# Patient Record
Sex: FEMALE | Race: WHITE | Employment: FULL TIME | URBAN - NONMETROPOLITAN AREA
[De-identification: names, ages, dates, MRNs, and addresses within clinical notes are randomized per-mention and may not be internally consistent; named-entity substitution may affect disease eponyms.]

---

## 2016-04-06 LAB
CHOLESTEROL, TOTAL: 183 MG/DL
CHOLESTEROL/HDL RATIO: 2.3
HDLC SERPL-MCNC: 81 MG/DL (ref 35–70)
LDL CHOLESTEROL CALCULATED: 94 MG/DL (ref 0–160)
TRIGL SERPL-MCNC: 40 MG/DL
VLDLC SERPL CALC-MCNC: 8 MG/DL

## 2017-06-05 DIAGNOSIS — I10 ESSENTIAL HYPERTENSION: Primary | ICD-10-CM

## 2017-06-06 RX ORDER — LABETALOL 100 MG/1
100 TABLET, FILM COATED ORAL 2 TIMES DAILY
Qty: 180 TABLET | Refills: 3 | OUTPATIENT
Start: 2017-06-06

## 2017-06-10 RX ORDER — LABETALOL 100 MG/1
100 TABLET, FILM COATED ORAL 2 TIMES DAILY
Qty: 180 TABLET | Refills: 3 | Status: SHIPPED | OUTPATIENT
Start: 2017-06-10 | End: 2018-07-03 | Stop reason: SDUPTHER

## 2017-06-13 DIAGNOSIS — I10 ESSENTIAL HYPERTENSION: ICD-10-CM

## 2017-06-14 RX ORDER — LABETALOL 100 MG/1
100 TABLET, FILM COATED ORAL 2 TIMES DAILY
Qty: 180 TABLET | Refills: 3 | OUTPATIENT
Start: 2017-06-14

## 2018-07-03 ENCOUNTER — OFFICE VISIT (OUTPATIENT)
Dept: FAMILY MEDICINE CLINIC | Age: 53
End: 2018-07-03
Payer: COMMERCIAL

## 2018-07-03 VITALS
HEART RATE: 64 BPM | SYSTOLIC BLOOD PRESSURE: 120 MMHG | DIASTOLIC BLOOD PRESSURE: 80 MMHG | WEIGHT: 154 LBS | BODY MASS INDEX: 25.66 KG/M2 | HEIGHT: 65 IN

## 2018-07-03 VITALS
HEART RATE: 64 BPM | BODY MASS INDEX: 24.32 KG/M2 | DIASTOLIC BLOOD PRESSURE: 86 MMHG | WEIGHT: 146 LBS | SYSTOLIC BLOOD PRESSURE: 124 MMHG | HEIGHT: 65 IN

## 2018-07-03 DIAGNOSIS — I10 BENIGN ESSENTIAL HYPERTENSION: ICD-10-CM

## 2018-07-03 DIAGNOSIS — G89.29 CHRONIC PAIN OF RIGHT KNEE: ICD-10-CM

## 2018-07-03 DIAGNOSIS — I10 ESSENTIAL HYPERTENSION: Primary | ICD-10-CM

## 2018-07-03 DIAGNOSIS — Z12.31 SCREENING MAMMOGRAM, ENCOUNTER FOR: ICD-10-CM

## 2018-07-03 DIAGNOSIS — M25.561 CHRONIC PAIN OF RIGHT KNEE: ICD-10-CM

## 2018-07-03 DIAGNOSIS — Z00.00 EXAMINATION, MEDICAL, GENERAL: ICD-10-CM

## 2018-07-03 DIAGNOSIS — Z78.0 ASYMPTOMATIC POSTMENOPAUSAL STATUS: ICD-10-CM

## 2018-07-03 PROCEDURE — 99396 PREV VISIT EST AGE 40-64: CPT | Performed by: FAMILY MEDICINE

## 2018-07-03 RX ORDER — LABETALOL 100 MG/1
100 TABLET, FILM COATED ORAL 2 TIMES DAILY
Qty: 180 TABLET | Refills: 3 | Status: SHIPPED | OUTPATIENT
Start: 2018-07-03 | End: 2019-07-29 | Stop reason: SDUPTHER

## 2018-07-03 RX ORDER — ESTRADIOL 0.1 MG/G
2 CREAM VAGINAL SEE ADMIN INSTRUCTIONS
Qty: 1 TUBE | Refills: 5 | Status: SHIPPED | OUTPATIENT
Start: 2018-07-03 | End: 2019-07-29 | Stop reason: SDUPTHER

## 2018-07-03 ASSESSMENT — ENCOUNTER SYMPTOMS
ABDOMINAL PAIN: 0
BLOOD IN STOOL: 0
SORE THROAT: 0
CONSTIPATION: 0
SHORTNESS OF BREATH: 0
DIARRHEA: 0
WHEEZING: 0

## 2018-07-03 ASSESSMENT — PATIENT HEALTH QUESTIONNAIRE - PHQ9
2. FEELING DOWN, DEPRESSED OR HOPELESS: 0
SUM OF ALL RESPONSES TO PHQ QUESTIONS 1-9: 0
1. LITTLE INTEREST OR PLEASURE IN DOING THINGS: 0
SUM OF ALL RESPONSES TO PHQ9 QUESTIONS 1 & 2: 0

## 2018-07-03 NOTE — PROGRESS NOTES
Current Outpatient Prescriptions   Medication Sig Dispense Refill    labetalol (NORMODYNE) 100 MG tablet Take 1 tablet by mouth 2 times daily 180 tablet 3    estradiol (ESTRACE) 0.1 MG/GM vaginal cream Place 2 g vaginally See Admin Instructions 1 Tube 5     No current facility-administered medications for this visit. No Known Allergies    Health Maintenance   Topic Date Due    Potassium monitoring  1965    Creatinine monitoring  1965    Hepatitis C screen  1965    HIV screen  11/02/1978    Cervical cancer screen  11/02/1984    Lipid screen  11/02/2003    Breast cancer screen  11/02/2013    Shingles Vaccine (1 of 2 - 2 Dose Series) 11/02/2013    Flu vaccine (1) 09/01/2018    DTaP/Tdap/Td vaccine (2 - Td) 11/25/2023    Colon cancer screen colonoscopy  06/13/2026       Subjective:      Review of Systems   Constitutional: Negative for appetite change, chills and fever. HENT: Negative for sore throat. Respiratory: Negative for shortness of breath and wheezing. Cardiovascular: Negative for chest pain, palpitations and leg swelling. Gastrointestinal: Negative for abdominal pain, blood in stool, constipation and diarrhea. Genitourinary: Negative for dysuria, hematuria and urgency. Hematological: Negative for adenopathy. Objective:     /80   Pulse 64   Ht 5' 5\" (1.651 m)   Wt 154 lb (69.9 kg)   LMP 12/02/2010   BMI 25.63 kg/m²     Physical Exam   Constitutional: She appears well-developed and well-nourished. HENT:   Head: Normocephalic. Right Ear: Tympanic membrane normal.   Left Ear: Tympanic membrane normal.   Nose: Nose normal.   Mouth/Throat: No oropharyngeal exudate or posterior oropharyngeal erythema. Neck: Neck supple. Carotid bruit is not present. No thyromegaly present. Cardiovascular: Normal rate, regular rhythm, S1 normal and S2 normal.    No murmur heard. Pulmonary/Chest: Breath sounds normal. She has no wheezes. She has no rhonchi. She has no rales. She exhibits no tenderness. Abdominal: Soft. There is no hepatosplenomegaly. There is no tenderness. Musculoskeletal: She exhibits no edema. Right knee: She exhibits abnormal patellar mobility. She exhibits normal range of motion, no swelling, no effusion, no ecchymosis, normal alignment, no LCL laxity, no bony tenderness, normal meniscus and no MCL laxity. No medial joint line, no lateral joint line, no MCL, no LCL and no patellar tendon tenderness noted. She doesn't have apprehension sign right patella more in [place] and more than 50% of its width   Lymphadenopathy:     She has no cervical adenopathy. She has no axillary adenopathy. Neurological: No cranial nerve deficit. Vitals reviewed. Assessment:      Diagnosis Orders   1. Essential hypertension  labetalol (NORMODYNE) 100 MG tablet   2. Screening mammogram, encounter for  LO DIGITAL SCREEN W CAD BILATERAL   3. Asymptomatic postmenopausal status  DEXA Bone Density Axial Skeleton   4. Examination, medical, general  Comprehensive Metabolic Panel, Fasting    CBC Auto Differential    Lipid Panel   5. Chronic pain of right knee  XR KNEE RIGHT (3 VIEWS)            POC Testing Results (If Applicable):  No results found for this visit on 07/03/18. Plan:   Bone density testing. Mammogram.  Refilled her medication. X-ray right knee. However, by some strengthening exercises for her patellar tracking.   Pending results recheck in 1 year and when necessary      Orders Given:  Orders Placed This Encounter   Procedures    LO DIGITAL SCREEN W CAD BILATERAL     Standing Status:   Future     Standing Expiration Date:   9/2/2019    DEXA Bone Density Axial Skeleton     Standing Status:   Future     Standing Expiration Date:   7/3/2019    XR KNEE RIGHT (3 VIEWS)     Standing Status:   Future     Standing Expiration Date:   7/3/2019    Comprehensive Metabolic Panel, Fasting     Standing Status:   Future     Standing Expiration

## 2018-07-09 LAB
A/G RATIO: 2 RATIO
AGE FOR GFR: 54
ALBUMIN: 4.3 G/DL
ALK PHOSPHATASE: 57 UNITS/L
ALT SERPL-CCNC: 47 UNITS/L
ANION GAP SERPL CALCULATED.3IONS-SCNC: 8 MMOL/L
AST SERPL-CCNC: 35 UNITS/L
BASOPHILS # BLD: 0.05 THOU/MM3
BILIRUB SERPL-MCNC: 0.8 MG/DL
BUN BLDV-MCNC: 20 MG/DL
CALCIUM SERPL-MCNC: 9.4 MG/DL
CHLORIDE BLD-SCNC: 105 MMOL/L
CHOLESTEROL/HDL RATIO: 2.2 RATIO
CHOLESTEROL: 179 MG/DL
CO2: 32 MMOL/L
CREAT SERPL-MCNC: 0.7 MG/DL
DIFFERENTIAL: AUTOMATED DIFF
EGFR BF: 106 ML/MIN/1.73 M2
EGFR BM: 142 ML/MIN/1.73 M2
EGFR WF: 87 ML/MIN/1.73 M2
EGFR WM: 118 ML/MIN/1.73 M2
EOSINOPHIL # BLD: 0.16 THOU/MM3
GLOBULIN: 2.2 G/DL
GLUCOSE: 88 MG/DL
HCT VFR BLD CALC: 38.8 %
HDL, DIRECT: 82 MG/DL
HEMOGLOBIN: 12.6 G/DL
LDL CHOLESTEROL CALCULATED: 88.4 MG/DL
LYMPHOCYTES # BLD: 2.29 THOU/MM3
MCH RBC QN AUTO: 28.2 PG
MCHC RBC AUTO-ENTMCNC: 32.4 G/DL
MCV RBC AUTO: 87.1 FL
MONOCYTES # BLD: 0.36 THOU/MM3
NEUTROPHILS: 2.62 THOU/MM3
PDW BLD-RTO: 12.1 %
PLATELET # BLD: 241 THOU/MM3
PMV BLD AUTO: 8.9 FL
POTASSIUM SERPL-SCNC: 4.2 MMOL/L
RBC # BLD: 4.46 M/UL
SODIUM BLD-SCNC: 141 MMOL/L
TOTAL PROTEIN: 6.5 G/DL
TRIGL SERPL-MCNC: 43 MG/DL
VLDLC SERPL CALC-MCNC: 9 MG/DL
WBC # BLD: 5.49 THOU/ML3

## 2018-07-12 DIAGNOSIS — Z78.0 ASYMPTOMATIC POSTMENOPAUSAL STATUS: ICD-10-CM

## 2019-07-29 ENCOUNTER — OFFICE VISIT (OUTPATIENT)
Dept: FAMILY MEDICINE CLINIC | Age: 54
End: 2019-07-29
Payer: COMMERCIAL

## 2019-07-29 VITALS
SYSTOLIC BLOOD PRESSURE: 110 MMHG | HEART RATE: 65 BPM | WEIGHT: 161 LBS | BODY MASS INDEX: 26.82 KG/M2 | OXYGEN SATURATION: 98 % | HEIGHT: 65 IN | DIASTOLIC BLOOD PRESSURE: 80 MMHG

## 2019-07-29 DIAGNOSIS — I10 ESSENTIAL HYPERTENSION: ICD-10-CM

## 2019-07-29 DIAGNOSIS — Z00.00 EXAMINATION, MEDICAL, GENERAL: Primary | ICD-10-CM

## 2019-07-29 DIAGNOSIS — Z11.59 NEED FOR HEPATITIS C SCREENING TEST: ICD-10-CM

## 2019-07-29 DIAGNOSIS — Z12.31 SCREENING MAMMOGRAM, ENCOUNTER FOR: ICD-10-CM

## 2019-07-29 PROCEDURE — 99396 PREV VISIT EST AGE 40-64: CPT | Performed by: FAMILY MEDICINE

## 2019-07-29 RX ORDER — LABETALOL 100 MG/1
100 TABLET, FILM COATED ORAL 2 TIMES DAILY
Qty: 180 TABLET | Refills: 3 | Status: SHIPPED | OUTPATIENT
Start: 2019-07-29 | End: 2020-06-17 | Stop reason: SDUPTHER

## 2019-07-29 RX ORDER — ESTRADIOL 0.1 MG/G
2 CREAM VAGINAL SEE ADMIN INSTRUCTIONS
Qty: 1 TUBE | Refills: 5 | Status: SHIPPED | OUTPATIENT
Start: 2019-07-29 | End: 2020-06-17 | Stop reason: SDUPTHER

## 2019-07-29 ASSESSMENT — ENCOUNTER SYMPTOMS
COUGH: 0
DIARRHEA: 0
SHORTNESS OF BREATH: 0
ABDOMINAL PAIN: 0
BLOOD IN STOOL: 0
CONSTIPATION: 0
WHEEZING: 0

## 2019-07-29 ASSESSMENT — PATIENT HEALTH QUESTIONNAIRE - PHQ9
SUM OF ALL RESPONSES TO PHQ9 QUESTIONS 1 & 2: 0
SUM OF ALL RESPONSES TO PHQ QUESTIONS 1-9: 0
1. LITTLE INTEREST OR PLEASURE IN DOING THINGS: 0
SUM OF ALL RESPONSES TO PHQ QUESTIONS 1-9: 0
2. FEELING DOWN, DEPRESSED OR HOPELESS: 0

## 2019-07-30 LAB
A/G RATIO: 1.9 RATIO
AGE FOR GFR: 55
ALBUMIN: 4.6 G/DL (ref 3.5–5)
ALK PHOSPHATASE: 62 UNITS/L (ref 38–126)
ALT SERPL-CCNC: 40 UNITS/L (ref 9–52)
ANION GAP SERPL CALCULATED.3IONS-SCNC: 12 MMOL/L
AST SERPL-CCNC: 36 UNITS/L (ref 14–36)
BASOPHILS # BLD: 0.06 THOU/MM3 (ref 0–0.3)
BILIRUB SERPL-MCNC: 0.9 MG/DL (ref 0.2–1.3)
BUN BLDV-MCNC: 14 MG/DL (ref 7–17)
CALCIUM SERPL-MCNC: 9.7 MG/DL (ref 8.4–10.2)
CHLORIDE BLD-SCNC: 104 MMOL/L (ref 98–120)
CHOLESTEROL/HDL RATIO: 2.6 RATIO (ref 0–4.5)
CHOLESTEROL: 184 MG/DL (ref 50–200)
CO2: 30 MMOL/L (ref 22–31)
CREAT SERPL-MCNC: 0.8 MG/DL (ref 0.5–1)
DIFFERENTIAL: AUTOMATED DIFF
EGFR BF: 90 ML/MIN/1.73 M2
EGFR BM: 122 ML/MIN/1.73 M2
EGFR WF: 74 ML/MIN/1.73 M2
EGFR WM: 100 ML/MIN/1.73 M2
EOSINOPHIL # BLD: 0.1 THOU/MM3 (ref 0–1.1)
GLOBULIN: 2.4 G/DL
GLUCOSE: 98 MG/DL (ref 65–105)
HCT VFR BLD CALC: 39.8 % (ref 37–47)
HDL, DIRECT: 71 MG/DL (ref 36–68)
HEMOGLOBIN: 13.3 G/DL (ref 12–16)
HEPATITIS C IGG: NORMAL
LDL CHOLESTEROL CALCULATED: 99.4 MG/DL (ref 0–160)
LYMPHOCYTES # BLD: 2 THOU/MM3 (ref 1–5.5)
MCH RBC QN AUTO: 28.7 PG (ref 28.5–32)
MCHC RBC AUTO-ENTMCNC: 33.4 G/DL (ref 32–37)
MCV RBC AUTO: 86 FL (ref 80–94)
MONOCYTES # BLD: 0.33 THOU/MM3 (ref 0.1–1)
NEUTROPHILS: 2.13 THOU/MM3 (ref 2–8.1)
PDW BLD-RTO: 11.1 % (ref 8.5–15.5)
PLATELET # BLD: 250 THOU/MM3 (ref 130–400)
PMV BLD AUTO: 8.3 FL (ref 7.4–11)
POTASSIUM SERPL-SCNC: 4.4 MMOL/L (ref 3.6–5)
RBC # BLD: 4.63 M/UL (ref 4.2–5.4)
SIGNAL/CUTOFF: NORMAL
SODIUM BLD-SCNC: 142 MMOL/L (ref 135–145)
TOTAL PROTEIN: 7 G/DL (ref 6.3–8.2)
TRIGL SERPL-MCNC: 68 MG/DL (ref 10–250)
VLDLC SERPL CALC-MCNC: 14 MG/DL (ref 0–40)
WBC # BLD: 4.62 THOU/ML3 (ref 4.8–10)

## 2019-07-31 DIAGNOSIS — Z11.59 NEED FOR HEPATITIS C SCREENING TEST: ICD-10-CM

## 2020-06-17 ENCOUNTER — HOSPITAL ENCOUNTER (OUTPATIENT)
Age: 55
Setting detail: SPECIMEN
Discharge: HOME | End: 2020-06-17
Payer: COMMERCIAL

## 2020-06-17 ENCOUNTER — OFFICE VISIT (OUTPATIENT)
Dept: FAMILY MEDICINE CLINIC | Age: 55
End: 2020-06-17
Payer: COMMERCIAL

## 2020-06-17 VITALS
BODY MASS INDEX: 26.29 KG/M2 | SYSTOLIC BLOOD PRESSURE: 118 MMHG | WEIGHT: 158 LBS | OXYGEN SATURATION: 98 % | DIASTOLIC BLOOD PRESSURE: 82 MMHG | HEART RATE: 69 BPM

## 2020-06-17 PROCEDURE — G8427 DOCREV CUR MEDS BY ELIG CLIN: HCPCS | Performed by: NURSE PRACTITIONER

## 2020-06-17 PROCEDURE — 1036F TOBACCO NON-USER: CPT | Performed by: NURSE PRACTITIONER

## 2020-06-17 PROCEDURE — G0145 SCR C/V CYTO,THINLAYER,RESCR: HCPCS

## 2020-06-17 PROCEDURE — 99396 PREV VISIT EST AGE 40-64: CPT | Performed by: NURSE PRACTITIONER

## 2020-06-17 PROCEDURE — G8419 CALC BMI OUT NRM PARAM NOF/U: HCPCS | Performed by: NURSE PRACTITIONER

## 2020-06-17 PROCEDURE — 3017F COLORECTAL CA SCREEN DOC REV: CPT | Performed by: NURSE PRACTITIONER

## 2020-06-17 RX ORDER — LABETALOL 100 MG/1
100 TABLET, FILM COATED ORAL 2 TIMES DAILY
Qty: 180 TABLET | Refills: 3 | Status: SHIPPED | OUTPATIENT
Start: 2020-06-17 | End: 2021-04-08 | Stop reason: SDUPTHER

## 2020-06-17 RX ORDER — ESTRADIOL 0.1 MG/G
2 CREAM VAGINAL SEE ADMIN INSTRUCTIONS
Qty: 1 TUBE | Refills: 3 | Status: SHIPPED | OUTPATIENT
Start: 2020-06-17 | End: 2021-04-08 | Stop reason: SDUPTHER

## 2020-06-17 ASSESSMENT — ENCOUNTER SYMPTOMS
DIARRHEA: 0
WHEEZING: 0
CONSTIPATION: 0
SHORTNESS OF BREATH: 0
BLOOD IN STOOL: 0
COUGH: 0
ABDOMINAL PAIN: 0

## 2020-06-17 ASSESSMENT — PATIENT HEALTH QUESTIONNAIRE - PHQ9
SUM OF ALL RESPONSES TO PHQ QUESTIONS 1-9: 0
SUM OF ALL RESPONSES TO PHQ QUESTIONS 1-9: 0
SUM OF ALL RESPONSES TO PHQ9 QUESTIONS 1 & 2: 0
1. LITTLE INTEREST OR PLEASURE IN DOING THINGS: 0
2. FEELING DOWN, DEPRESSED OR HOPELESS: 0

## 2020-06-21 ASSESSMENT — ENCOUNTER SYMPTOMS
ORTHOPNEA: 0
BLURRED VISION: 0

## 2020-06-24 LAB — CYTOLOGY REPORT: NORMAL

## 2020-07-10 LAB
ANION GAP SERPL CALCULATED.3IONS-SCNC: 9.3 MMOL/L
BUN BLDV-MCNC: 18 MG/DL (ref 7–17)
CALCIUM SERPL-MCNC: 9.6 MG/DL (ref 8.4–10.2)
CHLORIDE BLD-SCNC: 102 MMOL/L (ref 98–120)
CHOLESTEROL/HDL RATIO: 2.3 RATIO (ref 0–4.5)
CHOLESTEROL: 191 MG/DL (ref 50–200)
CO2: 28 MMOL/L (ref 22–31)
CREAT SERPL-MCNC: 0.7 MG/DL (ref 0.5–1)
CREATININE, RANDOM URINE: 78.7 MG/DL (ref 20–370)
GFR CALCULATED: > 60
GLUCOSE: 96 MG/DL (ref 65–105)
HDLC SERPL-MCNC: 83 MG/DL (ref 36–68)
LDL CHOLESTEROL CALCULATED: 97.8 MG/DL (ref 0–160)
MICROALBUMIN UR-MCNC: < 0.6 MG/DL (ref 0–1.7)
POTASSIUM SERPL-SCNC: 4.3 MMOL/L (ref 3.6–5)
SODIUM BLD-SCNC: 139 MMOL/L (ref 135–145)
TRIGL SERPL-MCNC: 51 MG/DL (ref 10–250)
VLDLC SERPL CALC-MCNC: 10 MG/DL (ref 0–40)

## 2020-07-16 ENCOUNTER — TELEPHONE (OUTPATIENT)
Dept: FAMILY MEDICINE CLINIC | Age: 55
End: 2020-07-16

## 2020-07-16 NOTE — TELEPHONE ENCOUNTER
Patient would like 6/17/20 visit rebilled as a wellness visit, instead of office visit. Jayy Clinton it was scheduled as a Pap/wellness visit. If you agree, need to do addendum off of original schedule and that should adjust the billing. If there is a problem with that, we should let patient know.

## 2021-08-25 ENCOUNTER — TELEPHONE (OUTPATIENT)
Dept: FAMILY MEDICINE CLINIC | Age: 56
End: 2021-08-25

## 2021-08-25 NOTE — TELEPHONE ENCOUNTER
----- Message from Antoine Market sent at 8/25/2021 10:00 AM EDT -----  Subject: Appointment Request    Reason for Call: Routine Medicare AWV    QUESTIONS  Type of Appointment? Established Patient  Reason for appointment request? No appointments available during search  Additional Information for Provider? Patient states that she would like to   schedule for her well visit. Patient also would like to inform Dr. Saira Durán that she is vaccinated for covid.   ---------------------------------------------------------------------------  --------------  CALL BACK INFO  What is the best way for the office to contact you? OK to leave message on   voicemail  Preferred Call Back Phone Number? [phone]  ---------------------------------------------------------------------------  --------------  SCRIPT ANSWERS  Relationship to Patient? Self  (If the patient has Medicare as their primary insurance coverage ask this   question) Are you requesting a Medicare Annual Wellness Visit? Yes   Have you been diagnosed with, awaiting test results for, or told that you   are suspected of having COVID-19 (Coronavirus)? (If patient has tested   negative or was tested as a requirement for work, school, or travel and   not based on symptoms, answer no)? No  Do you currently have flu-like symptoms including fever or chills, cough,   shortness of breath, difficulty breathing, or new loss of taste or smell? No  Have you had close contact with someone with COVID-19 in the last 14 days? No  (Service Expert  click yes below to proceed with [facility] As Usual   Scheduling)?  Yes

## 2021-08-30 ENCOUNTER — OFFICE VISIT (OUTPATIENT)
Dept: FAMILY MEDICINE CLINIC | Age: 56
End: 2021-08-30
Payer: COMMERCIAL

## 2021-08-30 VITALS
OXYGEN SATURATION: 97 % | WEIGHT: 163.3 LBS | SYSTOLIC BLOOD PRESSURE: 124 MMHG | HEIGHT: 64 IN | HEART RATE: 67 BPM | DIASTOLIC BLOOD PRESSURE: 80 MMHG | BODY MASS INDEX: 27.88 KG/M2

## 2021-08-30 DIAGNOSIS — Z00.00 ENCOUNTER FOR WELLNESS EXAMINATION IN ADULT: Primary | ICD-10-CM

## 2021-08-30 DIAGNOSIS — Z12.31 ENCOUNTER FOR SCREENING MAMMOGRAM FOR BREAST CANCER: ICD-10-CM

## 2021-08-30 DIAGNOSIS — Z13.220 SCREENING FOR HYPERLIPIDEMIA: ICD-10-CM

## 2021-08-30 DIAGNOSIS — I10 ESSENTIAL HYPERTENSION: ICD-10-CM

## 2021-08-30 DIAGNOSIS — N95.1 MENOPAUSAL VAGINAL DRYNESS: ICD-10-CM

## 2021-08-30 PROCEDURE — 99396 PREV VISIT EST AGE 40-64: CPT | Performed by: NURSE PRACTITIONER

## 2021-08-30 RX ORDER — LABETALOL 100 MG/1
100 TABLET, FILM COATED ORAL 2 TIMES DAILY
Qty: 180 TABLET | Refills: 2 | Status: SHIPPED | OUTPATIENT
Start: 2021-08-30 | End: 2022-09-01 | Stop reason: SDUPTHER

## 2021-08-30 RX ORDER — ESTRADIOL 0.1 MG/G
2 CREAM VAGINAL DAILY
Qty: 1 TUBE | Refills: 2 | Status: SHIPPED | OUTPATIENT
Start: 2021-08-30 | End: 2022-09-01 | Stop reason: SDUPTHER

## 2021-08-30 SDOH — ECONOMIC STABILITY: FOOD INSECURITY: WITHIN THE PAST 12 MONTHS, YOU WORRIED THAT YOUR FOOD WOULD RUN OUT BEFORE YOU GOT MONEY TO BUY MORE.: NEVER TRUE

## 2021-08-30 SDOH — ECONOMIC STABILITY: FOOD INSECURITY: WITHIN THE PAST 12 MONTHS, THE FOOD YOU BOUGHT JUST DIDN'T LAST AND YOU DIDN'T HAVE MONEY TO GET MORE.: NEVER TRUE

## 2021-08-30 ASSESSMENT — PATIENT HEALTH QUESTIONNAIRE - PHQ9
SUM OF ALL RESPONSES TO PHQ QUESTIONS 1-9: 0
SUM OF ALL RESPONSES TO PHQ QUESTIONS 1-9: 0
2. FEELING DOWN, DEPRESSED OR HOPELESS: 0
SUM OF ALL RESPONSES TO PHQ QUESTIONS 1-9: 0
SUM OF ALL RESPONSES TO PHQ9 QUESTIONS 1 & 2: 0
1. LITTLE INTEREST OR PLEASURE IN DOING THINGS: 0

## 2021-08-30 ASSESSMENT — SOCIAL DETERMINANTS OF HEALTH (SDOH): HOW HARD IS IT FOR YOU TO PAY FOR THE VERY BASICS LIKE FOOD, HOUSING, MEDICAL CARE, AND HEATING?: NOT HARD AT ALL

## 2021-08-30 NOTE — PROGRESS NOTES
[address] [address] [address]  Dept: [phone]  Dept Fax: [phone]      Chief Complaint   Patient presents with    Annual Exam     need refill on blood pressure medication and Estrace       HPI:   Well Adult Physical: Patient here for a comprehensive physical exam.The patient reports no problems. Do you take any herbs or supplements that were not prescribed by a doctor? No.  Are you taking calcium supplements? No.  Are you taking aspirin daily? No. She wears seatbelts while riding a car. She does not text or talk on the phone while driving. She performs all ofher ADL's without problem. She is independent, cooks, drives, bathes, and gets dressed without assistance. Last eye exam: up to date, 3/2021. Last dental visit 7/20/2021. She exercises by walking 3 to 5 days a week to 2-3.5 miles at a time.  History:  LMP: Patient's last menstrual period was 12/02/2010. Menopause 11 years ago  Last pap date: 6/17/2020  Abnormal pap? No    Patient completed COVID-19 vaccination?  Yes     The 10-year ASCVD risk score (Bashir Leo, et al., 2013) is: 1.5%    Values used to calculate the score:      Age: 60 years      Sex: Female      Is Non- : No      Diabetic: No      Tobacco smoker: No      Systolic Blood Pressure: 171 mmHg      Is BP treated: No      HDL Cholesterol: 83 mg/dL      Total Cholesterol: 191 mg/dL     BP Readings from Last 3 Encounters:   08/30/21 124/80   06/17/20 118/82   07/29/19 110/80        Pulse Readings from Last 3 Encounters:   08/30/21 67   06/17/20 69   07/29/19 65        Wt Readings from Last 3 Encounters:   08/30/21 163 lb 4.8 oz (74.1 kg)   06/17/20 158 lb (71.7 kg)   07/29/19 161 lb (73 kg)        Patient Care Team:  TROY Wise CNP as PCP - General (Family Medicine)  TROY Wise CNP as PCP - [facility] Empaneled Provider    Current Outpatient Medications   Medication Sig Dispense Refill    estradiol (ESTRACE) 0.1 MG/GM vaginal cream Place 2 g vaginally daily 1 Tube 2    labetalol (NORMODYNE) 100 MG tablet Take 1 tablet by mouth 2 times daily 180 tablet 2     No current facility-administered medications for this visit. Past Medical History:   Diagnosis Date    Hypertension        Past Surgical History:   Procedure Laterality Date     SECTION      x 3    CHOLECYSTECTOMY      COLONOSCOPY  16    chey, Dr. Amari Rosario at [address]         Family History   Problem Relation Age of Onset    High Cholesterol Mother     Heart Disease Father     Atrial Fibrillation Father         pacemaker       Social History     Socioeconomic History    Marital status:      Spouse name: Not on file    Number of children: Not on file    Years of education: Not on file    Highest education level: Not on file   Occupational History    Not on file   Tobacco Use    Smoking status: Never Smoker    Smokeless tobacco: Never Used   Substance and Sexual Activity    Alcohol use: No    Drug use: No    Sexual activity: Not on file   Other Topics Concern    Not on file   Social History Narrative    Not on file     Social Determinants of Health     Financial Resource Strain: Low Risk     Difficulty of Paying Living Expenses: Not hard at all   Food Insecurity: No Food Insecurity    Worried About [facility] in the Last Year: Never true    [facility] in the Last Year: Never true   Transportation Needs:     Lack of Transportation (Medical):      Lack of Transportation (Non-Medical):    Physical Activity:     Days of Exercise per Week:     Minutes of Exercise per Session:    Stress:     Feeling of Stress :    Social Connections:     Frequency of Communication with Friends and Family:     Frequency of Social Gatherings with Friends and Family:     Attends Congregational Services:     Active Member of Clubs or Organizations:     Attends Club or Organization Meetings:     Marital Status:    Intimate Partner Violence:     Fear of Current or Ex-Partner:     Emotionally Abused:     Physically Abused:     Sexually Abused:        No Known Allergies    Patient Active Problem List   Diagnosis    Essential hypertension    Menopausal vaginal dryness     Preventive Care:  Health Maintenance   Topic Date Due    HIV screen  Never done    Shingles Vaccine (1 of 2) Never done    Potassium monitoring  07/10/2021    Creatinine monitoring  07/10/2021    Flu vaccine (1) Never done    Breast cancer screen  08/15/2021    Cervical cancer screen  06/17/2023    DTaP/Tdap/Td vaccine (2 - Td or Tdap) 11/25/2023    Lipid screen  07/10/2025    Colon cancer screen colonoscopy  06/13/2026    COVID-19 Vaccine  Completed    Hepatitis C screen  Completed    Hepatitis A vaccine  Aged Out    Hepatitis B vaccine  Aged Out    Hib vaccine  Aged Out    Meningococcal (ACWY) vaccine  Aged Out    Pneumococcal 0-64 years Vaccine  Aged Out        Subjective:     Review of Systems   Constitutional: Negative for chills, diaphoresis, fatigue and fever. HENT: Negative. Eyes: Negative. Respiratory: Negative for cough, shortness of breath and wheezing. Cardiovascular: Negative for chest pain, palpitations and leg swelling. Gastrointestinal: Negative for abdominal pain, constipation, diarrhea and nausea. Endocrine: Negative for cold intolerance, heat intolerance, polydipsia, polyphagia and polyuria. Genitourinary: Positive for dyspareunia (dryness - cream helps). Musculoskeletal: Negative for arthralgias and myalgias. Skin: Negative. Allergic/Immunologic: Negative for environmental allergies and food allergies. Neurological: Negative for dizziness, weakness, light-headedness and headaches.    Psychiatric/Behavioral: Negative for agitation, decreased concentration, dysphoric mood, self-injury, sleep disturbance and suicidal ideas. The patient is not nervous/anxious. Objective:     Blood pressure 124/80, pulse 67, height 5' 3.5\" (1.613 m), weight 163 lb 4.8 oz (74.1 kg), last menstrual period 12/02/2010, SpO2 97 %. Body mass index is 28.47 kg/m². Physical Exam  Constitutional:       Appearance: Normal appearance. She is well-developed and well-groomed. HENT:      Head: Normocephalic. Right Ear: Tympanic membrane and ear canal normal.      Left Ear: Tympanic membrane and ear canal normal.      Nose: Nose normal.      Mouth/Throat:      Mouth: Mucous membranes are moist.   Eyes:      Conjunctiva/sclera: Conjunctivae normal.      Pupils: Pupils are equal, round, and reactive to light. Neck:      Thyroid: No thyromegaly. Vascular: No carotid bruit or JVD. Cardiovascular:      Rate and Rhythm: Normal rate and regular rhythm. Heart sounds: Normal heart sounds. Pulmonary:      Effort: Pulmonary effort is normal. No respiratory distress. Breath sounds: Normal breath sounds. No wheezing. Abdominal:      General: Bowel sounds are normal.      Palpations: Abdomen is soft. Tenderness: There is no abdominal tenderness. Musculoskeletal:      Cervical back: Neck supple. Right lower leg: No edema. Left lower leg: No edema. Lymphadenopathy:      Cervical: No cervical adenopathy. Skin:     Capillary Refill: Capillary refill takes less than 2 seconds. Neurological:      Mental Status: She is alert and oriented to person, place, and time. Psychiatric:         Mood and Affect: Mood normal.         Behavior: Behavior is cooperative. PHQ Scores 8/30/2021 6/17/2020 7/29/2019 7/3/2018   PHQ2 Score 0 0 0 0   PHQ9 Score 0 0 0 0     Interpretation of Total Score Depression Severity: 1-4 = Minimal depression, 5-9 = Mild depression, 10-14 = Moderate depression, 15-19 = Moderately severe depression, 20-27 = Severe depression     Assessment:     1.  Encounter for wellness examination in adult 2. Essential hypertension    3. Menopausal vaginal dryness    4. Screening for hyperlipidemia    5. Encounter for screening mammogram for breast cancer        Plan:     Orders Placed This Encounter   Medications    estradiol (ESTRACE) 0.1 MG/GM vaginal cream     Sig: Place 2 g vaginally daily     Dispense:  1 Tube     Refill:  2    labetalol (NORMODYNE) 100 MG tablet     Sig: Take 1 tablet by mouth 2 times daily     Dispense:  180 tablet     Refill:  2     Orders Placed This Encounter   Procedures    LO DIGITAL SCREEN W OR WO CAD BILATERAL     Standing Status:   Future     Standing Expiration Date:   8/30/2022     Order Specific Question:   Reason for exam:     Answer:   screening    Lipid, Fasting     Standing Status:   Future     Standing Expiration Date:   8/30/2022    Microalbumin, Ur     Standing Status:   Future     Standing Expiration Date:   8/30/2022    Basic Metabolic Panel     Standing Status:   Future     Standing Expiration Date:   8/30/2022       Follow a low fat, low cholesterol diet, attempt to lose weight, reduce salt in diet and cooking, reduce exposure to stress, continue current medications, continue current healthy lifestyle patterns and return for routine annual checkups. She was also counseled on her preventative health maintenance.      EDUCATION PROVIDED  Discussed and/or Handout Given on the following items:            [x] Caffeine Use: Limit to 2 cups per day   (400mg of caffeine a day)     [x] Exercise: Ideal 30 minutes per day, above normal activity              [x] Eating Fruits and Vegetables: Studies show 8-10 servings per day decrease BP  [x] Alcohol: Ideal maximum of one cup per day for females and two cups per day for a male       Electronically signed by TROY Deluna CNP on 9/4/2021

## 2021-09-04 PROBLEM — N95.1 MENOPAUSAL VAGINAL DRYNESS: Status: ACTIVE | Noted: 2021-09-04

## 2021-09-04 ASSESSMENT — ENCOUNTER SYMPTOMS
EYES NEGATIVE: 1
COUGH: 0
WHEEZING: 0
CONSTIPATION: 0
ABDOMINAL PAIN: 0
DIARRHEA: 0
SHORTNESS OF BREATH: 0
NAUSEA: 0

## 2021-09-10 LAB
ANION GAP SERPL CALCULATED.3IONS-SCNC: 7.3 MMOL/L
BUN BLDV-MCNC: 15 MG/DL (ref 7–17)
CALCIUM SERPL-MCNC: 9.8 MG/DL (ref 8.4–10.2)
CHLORIDE BLD-SCNC: 104 MMOL/L (ref 98–120)
CHOLESTEROL/HDL RATIO: 2.44 RATIO (ref 0–4.5)
CHOLESTEROL: 210 MG/DL (ref 50–200)
CO2: 31 MMOL/L (ref 22–31)
CREAT SERPL-MCNC: 0.8 MG/DL (ref 0.5–1)
CREATININE, RANDOM URINE: 193.1 MG/DL (ref 20–370)
GFR CALCULATED: > 60
GLUCOSE: 97 MG/DL (ref 65–105)
HDLC SERPL-MCNC: 86 MG/DL (ref 36–68)
LDL CHOLESTEROL CALCULATED: 112.8 MG/DL (ref 0–160)
MICROALBUMIN UR-MCNC: 0.8 MG/DL (ref 0–1.7)
MICROALBUMIN/CREAT UR-RTO: 4.14
POTASSIUM SERPL-SCNC: 4.4 MMOL/L (ref 3.6–5)
SODIUM BLD-SCNC: 142 MMOL/L (ref 135–145)
TRIGL SERPL-MCNC: 56 MG/DL (ref 10–250)
VLDLC SERPL CALC-MCNC: 11 MG/DL (ref 0–50)

## 2022-09-01 ENCOUNTER — OFFICE VISIT (OUTPATIENT)
Dept: FAMILY MEDICINE CLINIC | Age: 57
End: 2022-09-01
Payer: COMMERCIAL

## 2022-09-01 VITALS
WEIGHT: 169.2 LBS | DIASTOLIC BLOOD PRESSURE: 82 MMHG | SYSTOLIC BLOOD PRESSURE: 134 MMHG | BODY MASS INDEX: 29.5 KG/M2 | HEART RATE: 66 BPM | OXYGEN SATURATION: 98 %

## 2022-09-01 DIAGNOSIS — Z13.220 SCREENING FOR HYPERLIPIDEMIA: ICD-10-CM

## 2022-09-01 DIAGNOSIS — I10 ESSENTIAL HYPERTENSION: ICD-10-CM

## 2022-09-01 DIAGNOSIS — Z12.31 ENCOUNTER FOR SCREENING MAMMOGRAM FOR BREAST CANCER: ICD-10-CM

## 2022-09-01 DIAGNOSIS — N95.1 MENOPAUSAL VAGINAL DRYNESS: ICD-10-CM

## 2022-09-01 DIAGNOSIS — G89.29 CHRONIC BILATERAL LOW BACK PAIN, UNSPECIFIED WHETHER SCIATICA PRESENT: ICD-10-CM

## 2022-09-01 DIAGNOSIS — Z00.00 ENCOUNTER FOR WELLNESS EXAMINATION IN ADULT: Primary | ICD-10-CM

## 2022-09-01 DIAGNOSIS — M54.50 CHRONIC BILATERAL LOW BACK PAIN, UNSPECIFIED WHETHER SCIATICA PRESENT: ICD-10-CM

## 2022-09-01 LAB
ALBUMIN/GLOBULIN RATIO: 1.9 G/DL
ALBUMIN: 4.8 G/DL (ref 3.5–5)
ALP BLD-CCNC: 72 UNITS/L (ref 38–126)
ALT SERPL-CCNC: 54 UNITS/L (ref 4–35)
ANION GAP SERPL CALCULATED.3IONS-SCNC: 7.7 MMOL/L
AST SERPL-CCNC: 40 UNITS/L (ref 14–36)
BASOPHILS %: 1.03 (ref 0–3)
BASOPHILS ABSOLUTE: 0.06 (ref 0–0.3)
BILIRUB SERPL-MCNC: 0.6 MG/DL (ref 0.2–1.3)
BUN BLDV-MCNC: 20 MG/DL (ref 7–17)
CALCIUM SERPL-MCNC: 9.6 MG/DL (ref 8.4–10.2)
CHLORIDE BLD-SCNC: 106 MMOL/L (ref 98–120)
CHOLESTEROL/HDL RATIO: 2.95 RATIO (ref 0–4.5)
CHOLESTEROL: 224 MG/DL (ref 50–200)
CO2: 29 MMOL/L (ref 22–31)
CREAT SERPL-MCNC: 0.8 MG/DL (ref 0.5–1)
CREATININE, RANDOM URINE: 138 MG/DL (ref 20–370)
EOSINOPHILS %: 3.18 (ref 0–10)
EOSINOPHILS ABSOLUTE: 0.19 (ref 0–1.1)
GFR CALCULATED: > 60
GLOBULIN: 2.5 G/DL
GLUCOSE: 94 MG/DL (ref 65–105)
HCT VFR BLD CALC: 42.5 % (ref 37–47)
HDLC SERPL-MCNC: 76 MG/DL (ref 36–68)
HEMOGLOBIN: 12.9 (ref 12–16)
LDL CHOLESTEROL CALCULATED: 134.8 MG/DL (ref 0–160)
LYMPHOCYTE %: 35.28 (ref 20–51.1)
LYMPHOCYTES ABSOLUTE: 2.13 (ref 1–5.5)
MCH RBC QN AUTO: 27.9 PG (ref 28.5–32.5)
MCHC RBC AUTO-ENTMCNC: 30.5 G/DL (ref 32–37)
MCV RBC AUTO: 91.5 FL (ref 80–94)
MICROALBUMIN UR-MCNC: 0.9 MG/DL (ref 0–1.7)
MICROALBUMIN/CREAT UR-RTO: 6.52
MONOCYTES %: 6.97 (ref 1.7–9.3)
MONOCYTES ABSOLUTE: 0.42 (ref 0.1–1)
NEUTROPHILS %: 53.54 (ref 42.2–75.2)
NEUTROPHILS ABSOLUTE: 3.23 (ref 2–8.1)
PDW BLD-RTO: 12.7 % (ref 8.5–15.5)
PLATELET # BLD: 264.6 THOU/MM3 (ref 130–400)
POTASSIUM SERPL-SCNC: 4.4 MMOL/L (ref 3.6–5)
RBC: 4.64 M/UL (ref 4.2–5.4)
SODIUM BLD-SCNC: 142 MMOL/L (ref 135–145)
TOTAL PROTEIN, SERUM: 7.3 G/DL (ref 6.3–8.2)
TRIGL SERPL-MCNC: 66 MG/DL (ref 10–250)
VLDLC SERPL CALC-MCNC: 13 MG/DL (ref 0–50)
WBC: 6 THOU/ML3 (ref 4.8–10.8)

## 2022-09-01 PROCEDURE — 99396 PREV VISIT EST AGE 40-64: CPT | Performed by: NURSE PRACTITIONER

## 2022-09-01 RX ORDER — ESTRADIOL 0.1 MG/G
2 CREAM VAGINAL DAILY
Qty: 1 EACH | Refills: 2 | Status: SHIPPED | OUTPATIENT
Start: 2022-09-01

## 2022-09-01 RX ORDER — MELOXICAM 7.5 MG/1
TABLET ORAL
Qty: 60 TABLET | Refills: 0 | Status: SHIPPED | OUTPATIENT
Start: 2022-09-01

## 2022-09-01 RX ORDER — LABETALOL 100 MG/1
100 TABLET, FILM COATED ORAL 2 TIMES DAILY
Qty: 180 TABLET | Refills: 2 | Status: SHIPPED | OUTPATIENT
Start: 2022-09-01

## 2022-09-01 ASSESSMENT — PATIENT HEALTH QUESTIONNAIRE - PHQ9
SUM OF ALL RESPONSES TO PHQ QUESTIONS 1-9: 0
SUM OF ALL RESPONSES TO PHQ QUESTIONS 1-9: 0
SUM OF ALL RESPONSES TO PHQ9 QUESTIONS 1 & 2: 0
1. LITTLE INTEREST OR PLEASURE IN DOING THINGS: 0
2. FEELING DOWN, DEPRESSED OR HOPELESS: 0
SUM OF ALL RESPONSES TO PHQ QUESTIONS 1-9: 0
SUM OF ALL RESPONSES TO PHQ QUESTIONS 1-9: 0

## 2022-09-01 ASSESSMENT — ENCOUNTER SYMPTOMS: BOWEL INCONTINENCE: 0

## 2022-09-01 NOTE — PROGRESS NOTES
[address] [address] [address]  Dept: [phone]  Dept Fax: [phone]    No chief complaint on file. Well Adult Physical: Patient here for a comprehensive physical exam.The patient reports {problems:64010}  Do you take any herbs or supplements that were not prescribed by a doctor? no Are you taking calcium supplements? no Are you taking aspirin daily? no    She {DOES/DOES NOT:20953} work. She works *** hours a week at [employer] She does not take over the counter vitamins. She wears seatbelts while riding a car. She does not text or talk on the phone while driving. She performs all ofher ADL's without problem. She is independent, cooks, drives, bathes, and gets dressed without assistance. Last eye exam: ***,{NORMAL/ABNORMAL:584924945}. Last dental exam: She {IS/IS NOT:9024} happy with her life. She rates her stress level a {NUMBERS 0-12:66392} in a scale 1-10.  History:  {gu history select gender:98736}     GYNECOLOGIC HISTORY:    Her last pap smear was Normal {ABNORMALPAP RESULTS:444347089} and was done on 6/17/2020. Her HPV status is not done. STD history: Yes. She denies signs of vaginitis. Cramping: Yes  Birth control method: ***  She {DOES/DOES NOT:20953} perform regular breast self exams. Menopause: {YES OR NO:20022}. WELL QUESTIONS  How many cups of caffeine do you drink per day? {Blank multiple:19196::\"I do not consume any caffeine products daily\",\"*** cups of tea\",\"*** cups of soda\",\"*** cups of energy drink\",\"*** snack sized candy bars a day\",\"***full sized candy bars a day\",\"*** cups of coffee\"}   2. Do you eat a minimum of 8-10 servings of fruits and vegetables per day? {Blank single:19197::\"Yes\",\"No, on average the patient consumes *** servings of fruits and vegetables per day\"}  3. Do you drink alcohol? {Blank single:83898::\"Yes, on average the patient consumes *** cups of alcohol daily\",\"No\"}  4. How many oz of water do you drink per day?  (64 oz per day recommended) ***  5.  Do you exercise a minimum of 30 minutes per day, above normal activity? {Blank single:23542::\"Yes\",\"No, on average the patient performs *** minutes of exercise per day\"}    6. Last eye exam: ***,{NORMAL/ABNORMAL:284652357}. Patient completed COVID-19 vaccination? Yes     The ASCVD Risk score (Lisa Ruggiero et al., 2013) failed to calculate for the following reasons: The systolic blood pressure is missing     BP Readings from Last 3 Encounters:   21 124/80   20 118/82   19 110/80        Pulse Readings from Last 3 Encounters:   21 67   20 69   19 65        Wt Readings from Last 3 Encounters:   21 163 lb 4.8 oz (74.1 kg)   20 158 lb (71.7 kg)   19 161 lb (73 kg)        Patient Care Team:  TROY Maldonado CNP as PCP - General (Family Medicine)  TROY aMldonado CNP as PCP - [facility] Empaneled Provider    Current Outpatient Medications   Medication Sig Dispense Refill    estradiol (ESTRACE) 0.1 MG/GM vaginal cream Place 2 g vaginally daily 1 Tube 2    labetalol (NORMODYNE) 100 MG tablet Take 1 tablet by mouth 2 times daily 180 tablet 2     No current facility-administered medications for this visit.        Past Medical History:   Diagnosis Date    Hypertension        Past Surgical History:   Procedure Laterality Date     SECTION      x 3    CHOLECYSTECTOMY      COLONOSCOPY  16    chey, Dr. Todd Tompkins at [address]         Family History   Problem Relation Age of Onset    High Cholesterol Mother     Heart Disease Father     Atrial Fibrillation Father         pacemaker       Social History     Socioeconomic History    Marital status:      Spouse name: Not on file    Number of children: Not on file    Years of education: Not on file    Highest education level: Not on file   Occupational History    Not on file Tobacco Use    Smoking status: Never    Smokeless tobacco: Never   Substance and Sexual Activity    Alcohol use: No    Drug use: No    Sexual activity: Not on file   Other Topics Concern    Not on file   Social History Narrative    Not on file     Social Determinants of Health     Financial Resource Strain: Not on file   Food Insecurity: Not on file   Transportation Needs: Not on file   Physical Activity: Not on file   Stress: Not on file   Social Connections: Not on file   Intimate Partner Violence: Not on file   Housing Stability: Not on file       No Known Allergies    Patient Active Problem List   Diagnosis    Essential hypertension    Menopausal vaginal dryness       Preventive Care:  Health Maintenance   Topic Date Due    HIV screen  Never done    Shingles vaccine (1 of 2) Never done    COVID-19 Vaccine (3 - Booster for Moderna series) 07/19/2021    Flu vaccine (1) Never done    Depression Screen  08/30/2022    Cervical cancer screen  06/17/2023    Breast cancer screen  09/10/2023    DTaP/Tdap/Td vaccine (2 - Td or Tdap) 11/25/2023    Colorectal Cancer Screen  06/13/2026    Lipids  09/10/2026    Hepatitis C screen  Completed    Hepatitis A vaccine  Aged Out    Hepatitis B vaccine  Aged Out    Hib vaccine  Aged Out    Meningococcal (ACWY) vaccine  Aged Out    Pneumococcal 0-64 years Vaccine  Aged Out        Subjective:       Review of Systems      Objective:     Last menstrual period 12/02/2010. Physical Exam    PHQ Scores 8/30/2021 6/17/2020 7/29/2019 7/3/2018   PHQ2 Score 0 0 0 0   PHQ9 Score 0 0 0 0     Interpretation of Total Score Depression Severity: 1-4 = Minimal depression, 5-9 = Mild depression, 10-14 = Moderate depression, 15-19 = Moderately severe depression, 20-27 = Severe depression     Lab Review not applicable     Assessment/Plan:   {There are no diagnoses linked to this encounter. (Refresh or delete this SmartLink)}     No results found for this visit on 09/01/22.     {lifestyle advice:019279}    Encouraged monthly self breast exams. Instructed to continue current medications. She was also counseled on her preventative health maintenance recommendations and follow-up.     EDUCATION PROVIDED  Discussed and/or Handout Given on the following items:            [] Caffeine Use: Limit to 2 cups per day   (400mg of caffeine a day)     [] Exercise: Ideal 30 minutes per day, above normal activity              [] Eating Fruits and Vegetables: Studies show 8-10 servings per day decrease BP  [] Alcohol: Ideal maximum of one cup per day for females and two cups per day for a male       Electronically signed by Angel Phan MA on 9/1/2022

## 2022-09-01 NOTE — PROGRESS NOTES
[address] [address] [address]  Dept: [phone]  Dept Fax: [phone]    History and Physical  Patient:  Andree Craig  YOB: 1965  Date of Service:  2022    Subjective:   Andree Craig (:  1965) is a 60 y.o. female, Established patient, here for evaluation of the following chief complaint(s):    Chief Complaint   Patient presents with    Back Pain     Reports has had back pain, was a  for [employer] so was sitting a lot, pain started about a year ago but now had noticed that big toes have been having a tingling sensation every now and then since last few months      Had a roller blade accident many years ago. Back Pain  This is a chronic problem. The current episode started more than 1 year ago. The problem occurs constantly. The problem has been gradually worsening since onset. The pain is present in the lumbar spine. The quality of the pain is described as aching. The pain does not radiate. Associated symptoms include tingling (bilateral great toe). Pertinent negatives include no abdominal pain, bladder incontinence, bowel incontinence, chest pain, dysuria, fever, headaches, leg pain or weakness. She has tried home exercises and NSAIDs (massage) for the symptoms. The treatment provided mild relief. Hypertension   This is a chronic problem. The current episode started more than 1 year ago. The problem is controlled. Pertinent negatives include no anxiety, blurred vision, chest pain, headaches, malaise/fatigue, orthopnea, palpitations, peripheral edema or shortness of breath. Agents associated with hypertension include estrogens. Risk factors for coronary artery disease include family history and post-menopausal state. Past treatments include beta blockers. The current treatment provides significant improvement. There are no compliance problems. There is no history of CAD/MI, CVA or heart failure. Drug use: No       Review of Systems:     Review of Systems   Constitutional:  Negative for appetite change, chills, fatigue and fever. HENT: Negative. Eyes: Negative. Respiratory:  Negative for cough, shortness of breath and wheezing. Cardiovascular:  Negative for chest pain, palpitations and leg swelling. Gastrointestinal:  Negative for abdominal pain, bowel incontinence, constipation and diarrhea. Endocrine: Negative for cold intolerance, heat intolerance, polydipsia, polyphagia and polyuria. Genitourinary: Negative. Negative for bladder incontinence and dysuria. Musculoskeletal:  Positive for back pain (low back). Negative for arthralgias and myalgias. Skin: Negative. Allergic/Immunologic: Negative for environmental allergies and food allergies. Neurological:  Positive for tingling (bilateral great toe). Negative for dizziness, weakness, light-headedness and headaches. Psychiatric/Behavioral:  Negative for agitation, dysphoric mood, self-injury, sleep disturbance and suicidal ideas. The patient is not nervous/anxious. PHQ Scores 9/1/2022 8/30/2021 6/17/2020 7/29/2019 7/3/2018   PHQ2 Score 0 0 0 0 0   PHQ9 Score 0 0 0 0 0     Interpretation of Total Score Depression Severity: 1-4 = Minimal depression, 5-9 = Mild depression, 10-14 = Moderate depression, 15-19 = Moderately severe depression, 20-27 = Severe depression     Physical Exam:     Vitals:    09/01/22 0800   BP: 134/82   Site: Right Upper Arm   Position: Sitting   Cuff Size: Medium Adult   Pulse: 66   SpO2: 98%   Weight: 169 lb 3.2 oz (76.7 kg)      Body mass index is 29.5 kg/m². Physical Exam  Constitutional:       Appearance: Normal appearance. She is well-developed and well-groomed. HENT:      Head: Normocephalic. Eyes:      Conjunctiva/sclera: Conjunctivae normal.   Neck:      Thyroid: No thyromegaly. Vascular: No carotid bruit. Cardiovascular:      Rate and Rhythm: Normal rate and regular rhythm. Heart sounds: Normal heart sounds. Pulmonary:      Effort: Pulmonary effort is normal.      Breath sounds: Normal breath sounds. No wheezing. Abdominal:      General: Bowel sounds are normal.      Palpations: Abdomen is soft. Tenderness: There is no abdominal tenderness. Musculoskeletal:      Cervical back: Neck supple. Lumbar back: Tenderness present. Normal range of motion. Negative right straight leg raise test and negative left straight leg raise test.      Right lower leg: No edema. Left lower leg: No edema. Lymphadenopathy:      Cervical: No cervical adenopathy. Skin:     Capillary Refill: Capillary refill takes less than 2 seconds. Neurological:      Mental Status: She is alert and oriented to person, place, and time. Gait: Gait normal.   Psychiatric:         Mood and Affect: Mood normal.         Behavior: Behavior is cooperative. Assessment/Plan:   1. Encounter for wellness examination in adult  -     LO DIGITAL SCREEN W OR WO CAD BILATERAL; Future  -     Microalbumin, Ur; Future  -     Comprehensive Metabolic Panel; Future  -     CBC with Auto Differential; Future  -     Lipid, Fasting; Future  2. Essential hypertension  -     labetalol (NORMODYNE) 100 MG tablet; Take 1 tablet by mouth 2 times daily, Disp-180 tablet, R-2Normal  -     Microalbumin, Ur; Future  -     Comprehensive Metabolic Panel; Future  -     CBC with Auto Differential; Future  -     Lipid, Fasting; Future  3. Menopausal vaginal dryness  -     estradiol (ESTRACE) 0.1 MG/GM vaginal cream; Place 2 g vaginally daily, Disp-1 each, R-2Normal  4. Chronic bilateral low back pain, unspecified whether sciatica present  -     XR LUMBAR SPINE (2-3 VIEWS); Future  -     meloxicam (MOBIC) 7.5 MG tablet; Take 1 tablet by mouth 1-2 times daily with a meal for pain., Disp-60 tablet, R-0Normal  5. Screening for hyperlipidemia  -     Lipid, Fasting; Future  6.  Encounter for screening mammogram for breast cancer  - LO DIGITAL SCREEN W OR WO CAD BILATERAL; Future      All patient questions answered. Patient voiced understanding. Instructed to continue current medications. Patient agreed with treatment plan. Follow up as directed. I have recommended that this patient have shingles, COVID, and flu vaccines but she declines at this time. I have discussed the risks and benefits of this examination with her. The patient verbalizes understanding. Return in about 6 months (around 3/1/2023). Please note that this chart was generated using voice recognition Dragon dictation software. Although every effort was made to ensure the accuracy of this automated transcription, some errors in transcription may have occurred.     Electronically signed by Roberts Hammans, APRN - CNP on 9/11/2022

## 2022-09-06 DIAGNOSIS — Z00.00 ENCOUNTER FOR WELLNESS EXAMINATION IN ADULT: ICD-10-CM

## 2022-09-06 DIAGNOSIS — G89.29 CHRONIC BILATERAL LOW BACK PAIN, UNSPECIFIED WHETHER SCIATICA PRESENT: ICD-10-CM

## 2022-09-06 DIAGNOSIS — Z13.220 SCREENING FOR HYPERLIPIDEMIA: ICD-10-CM

## 2022-09-06 DIAGNOSIS — I10 ESSENTIAL HYPERTENSION: ICD-10-CM

## 2022-09-06 DIAGNOSIS — M54.50 CHRONIC BILATERAL LOW BACK PAIN, UNSPECIFIED WHETHER SCIATICA PRESENT: ICD-10-CM

## 2022-09-11 PROBLEM — G89.29 CHRONIC BILATERAL LOW BACK PAIN: Status: ACTIVE | Noted: 2022-09-11

## 2022-09-11 PROBLEM — M54.50 CHRONIC BILATERAL LOW BACK PAIN: Status: ACTIVE | Noted: 2022-09-11

## 2022-09-11 ASSESSMENT — ENCOUNTER SYMPTOMS
SHORTNESS OF BREATH: 0
DIARRHEA: 0
CONSTIPATION: 0
ABDOMINAL PAIN: 0
EYES NEGATIVE: 1
COUGH: 0
WHEEZING: 0
BACK PAIN: 1

## 2022-09-22 DIAGNOSIS — Z12.31 ENCOUNTER FOR SCREENING MAMMOGRAM FOR BREAST CANCER: ICD-10-CM

## 2022-09-22 DIAGNOSIS — Z00.00 ENCOUNTER FOR WELLNESS EXAMINATION IN ADULT: ICD-10-CM

## 2023-09-05 ENCOUNTER — OFFICE VISIT (OUTPATIENT)
Dept: FAMILY MEDICINE CLINIC | Age: 58
End: 2023-09-05

## 2023-09-05 VITALS
BODY MASS INDEX: 29.37 KG/M2 | DIASTOLIC BLOOD PRESSURE: 86 MMHG | SYSTOLIC BLOOD PRESSURE: 132 MMHG | WEIGHT: 172 LBS | HEIGHT: 64 IN | OXYGEN SATURATION: 98 % | HEART RATE: 64 BPM

## 2023-09-05 DIAGNOSIS — Z12.72 SMEAR, VAGINAL, AS PART OF ROUTINE GYNECOLOGICAL EXAMINATION: ICD-10-CM

## 2023-09-05 DIAGNOSIS — Z01.419 SMEAR, VAGINAL, AS PART OF ROUTINE GYNECOLOGICAL EXAMINATION: ICD-10-CM

## 2023-09-05 DIAGNOSIS — R25.1 TREMOR OF RIGHT HAND: ICD-10-CM

## 2023-09-05 DIAGNOSIS — N95.1 MENOPAUSAL VAGINAL DRYNESS: ICD-10-CM

## 2023-09-05 DIAGNOSIS — I10 ESSENTIAL HYPERTENSION: ICD-10-CM

## 2023-09-05 DIAGNOSIS — Z12.31 ENCOUNTER FOR SCREENING MAMMOGRAM FOR BREAST CANCER: ICD-10-CM

## 2023-09-05 DIAGNOSIS — E78.49 OTHER HYPERLIPIDEMIA: ICD-10-CM

## 2023-09-05 DIAGNOSIS — Z00.00 ENCOUNTER FOR WELLNESS EXAMINATION IN ADULT: Primary | ICD-10-CM

## 2023-09-05 RX ORDER — LABETALOL 100 MG/1
100 TABLET, FILM COATED ORAL 2 TIMES DAILY
Qty: 180 TABLET | Refills: 3 | Status: SHIPPED | OUTPATIENT
Start: 2023-09-05

## 2023-09-05 RX ORDER — ESTRADIOL 0.1 MG/G
2 CREAM VAGINAL DAILY
Qty: 1 EACH | Refills: 3 | Status: SHIPPED | OUTPATIENT
Start: 2023-09-05

## 2023-09-06 LAB
ALBUMIN/GLOBULIN RATIO: 1.8 G/DL
ALBUMIN: 4.2 G/DL (ref 3.5–5)
ALP BLD-CCNC: 63 UNITS/L (ref 38–126)
ALT SERPL-CCNC: 49 UNITS/L (ref 4–35)
ANION GAP SERPL CALCULATED.3IONS-SCNC: 7.4 MMOL/L (ref 4–12)
AST SERPL-CCNC: 44 UNITS/L (ref 14–36)
BASOPHILS %: 1.27 (ref 0–3)
BASOPHILS ABSOLUTE: 0.07 (ref 0–0.3)
BILIRUB SERPL-MCNC: 0.9 MG/DL (ref 0.2–1.3)
BUN BLDV-MCNC: 17 MG/DL (ref 7–17)
CALCIUM SERPL-MCNC: 9.5 MG/DL (ref 8.4–10.2)
CHLORIDE BLD-SCNC: 108 MMOL/L (ref 98–120)
CHOLESTEROL/HDL RATIO: 2.68 RATIO (ref 0–4.5)
CHOLESTEROL: 190 MG/DL (ref 50–200)
CO2: 27 MMOL/L (ref 22–31)
CREAT SERPL-MCNC: 0.8 MG/DL (ref 0.5–1)
CREATININE, RANDOM URINE: 80.1 MG/DL (ref 20–370)
EOSINOPHILS %: 3.21 (ref 0–10)
EOSINOPHILS ABSOLUTE: 0.17 (ref 0–1.1)
GFR CALCULATED: > 60
GLOBULIN: 2.3 G/DL
GLUCOSE: 93 MG/DL (ref 65–105)
HBA1C MFR BLD: 5.6 % (ref 4.4–6.4)
HCT VFR BLD CALC: 38.8 % (ref 37–47)
HDLC SERPL-MCNC: 71 MG/DL (ref 36–68)
HEMOGLOBIN: 11.7 (ref 12–16)
LDL CHOLESTEROL CALCULATED: 104.2 MG/DL (ref 0–160)
LYMPHOCYTE %: 32.61 (ref 20–51.1)
LYMPHOCYTES ABSOLUTE: 1.75 (ref 1–5.5)
MCH RBC QN AUTO: 26.6 PG (ref 28.5–32.5)
MCHC RBC AUTO-ENTMCNC: 30.1 G/DL (ref 32–37)
MCV RBC AUTO: 88.4 FL (ref 80–94)
MICROALBUMIN UR-MCNC: < 0.6 MG/DL (ref 0–1.7)
MONOCYTES %: 7.34 (ref 1.7–9.3)
MONOCYTES ABSOLUTE: 0.39 (ref 0.1–1)
NEUTROPHILS %: 55.56 (ref 42.2–75.2)
NEUTROPHILS ABSOLUTE: 2.98 (ref 2–8.1)
PDW BLD-RTO: 13.4 % (ref 8.5–15.5)
PLATELET # BLD: 230.3 THOU/MM3 (ref 130–400)
POTASSIUM SERPL-SCNC: 4.5 MMOL/L (ref 3.6–5)
RBC: 4.39 M/UL (ref 4.2–5.4)
SODIUM BLD-SCNC: 142 MMOL/L (ref 135–145)
TOTAL PROTEIN, SERUM: 6.5 G/DL (ref 6.3–8.2)
TRIGL SERPL-MCNC: 74 MG/DL (ref 10–250)
TSH REFLEX FT4: 2.09 MIU/ML (ref 0.49–4.67)
VITAMIN B-12: 447 PG/ML (ref 239–931)
VLDLC SERPL CALC-MCNC: 15 MG/DL (ref 0–50)
WBC: 5.4 THOU/ML3 (ref 4.8–10.8)

## 2023-09-10 LAB — GYNECOLOGY CYTOLOGY REPORT: NORMAL

## 2023-09-16 PROBLEM — E78.49 OTHER HYPERLIPIDEMIA: Status: ACTIVE | Noted: 2023-09-16

## 2023-09-16 PROBLEM — R25.1 TREMOR OF RIGHT HAND: Status: ACTIVE | Noted: 2023-09-16

## 2023-09-16 ASSESSMENT — ENCOUNTER SYMPTOMS
DIARRHEA: 0
SHORTNESS OF BREATH: 0
COUGH: 0
BLOOD IN STOOL: 0
CONSTIPATION: 0
WHEEZING: 0
ABDOMINAL PAIN: 0

## 2024-12-02 ASSESSMENT — PATIENT HEALTH QUESTIONNAIRE - PHQ9
SUM OF ALL RESPONSES TO PHQ QUESTIONS 1-9: 0
SUM OF ALL RESPONSES TO PHQ9 QUESTIONS 1 & 2: 0
1. LITTLE INTEREST OR PLEASURE IN DOING THINGS: NOT AT ALL
SUM OF ALL RESPONSES TO PHQ QUESTIONS 1-9: 0
1. LITTLE INTEREST OR PLEASURE IN DOING THINGS: NOT AT ALL
2. FEELING DOWN, DEPRESSED OR HOPELESS: NOT AT ALL
SUM OF ALL RESPONSES TO PHQ9 QUESTIONS 1 & 2: 0
2. FEELING DOWN, DEPRESSED OR HOPELESS: NOT AT ALL

## 2024-12-04 ENCOUNTER — OFFICE VISIT (OUTPATIENT)
Dept: FAMILY MEDICINE CLINIC | Age: 59
End: 2024-12-04
Payer: COMMERCIAL

## 2024-12-04 VITALS
BODY MASS INDEX: 29.64 KG/M2 | DIASTOLIC BLOOD PRESSURE: 78 MMHG | HEART RATE: 67 BPM | HEIGHT: 64 IN | WEIGHT: 173.6 LBS | OXYGEN SATURATION: 97 % | SYSTOLIC BLOOD PRESSURE: 134 MMHG

## 2024-12-04 DIAGNOSIS — Z12.31 ENCOUNTER FOR SCREENING MAMMOGRAM FOR BREAST CANCER: ICD-10-CM

## 2024-12-04 DIAGNOSIS — N95.1 MENOPAUSAL VAGINAL DRYNESS: ICD-10-CM

## 2024-12-04 DIAGNOSIS — Z00.00 ENCOUNTER FOR WELLNESS EXAMINATION IN ADULT: Primary | ICD-10-CM

## 2024-12-04 DIAGNOSIS — I10 ESSENTIAL HYPERTENSION: ICD-10-CM

## 2024-12-04 DIAGNOSIS — R25.1 TREMOR OF RIGHT HAND: ICD-10-CM

## 2024-12-04 DIAGNOSIS — E78.49 OTHER HYPERLIPIDEMIA: ICD-10-CM

## 2024-12-04 PROCEDURE — G8484 FLU IMMUNIZE NO ADMIN: HCPCS | Performed by: NURSE PRACTITIONER

## 2024-12-04 PROCEDURE — 3075F SYST BP GE 130 - 139MM HG: CPT | Performed by: NURSE PRACTITIONER

## 2024-12-04 PROCEDURE — 99396 PREV VISIT EST AGE 40-64: CPT | Performed by: NURSE PRACTITIONER

## 2024-12-04 PROCEDURE — 3078F DIAST BP <80 MM HG: CPT | Performed by: NURSE PRACTITIONER

## 2024-12-04 RX ORDER — ESTRADIOL 0.1 MG/G
2 CREAM VAGINAL DAILY
Qty: 1 EACH | Refills: 3 | Status: SHIPPED | OUTPATIENT
Start: 2024-12-04

## 2024-12-04 RX ORDER — LABETALOL 100 MG/1
100 TABLET, FILM COATED ORAL 2 TIMES DAILY
Qty: 180 TABLET | Refills: 3 | Status: SHIPPED | OUTPATIENT
Start: 2024-12-04

## 2024-12-04 SDOH — ECONOMIC STABILITY: FOOD INSECURITY: WITHIN THE PAST 12 MONTHS, THE FOOD YOU BOUGHT JUST DIDN'T LAST AND YOU DIDN'T HAVE MONEY TO GET MORE.: NEVER TRUE

## 2024-12-04 SDOH — ECONOMIC STABILITY: FOOD INSECURITY: WITHIN THE PAST 12 MONTHS, YOU WORRIED THAT YOUR FOOD WOULD RUN OUT BEFORE YOU GOT MONEY TO BUY MORE.: NEVER TRUE

## 2024-12-04 SDOH — ECONOMIC STABILITY: INCOME INSECURITY: HOW HARD IS IT FOR YOU TO PAY FOR THE VERY BASICS LIKE FOOD, HOUSING, MEDICAL CARE, AND HEATING?: NOT HARD AT ALL

## 2024-12-04 NOTE — PROGRESS NOTES
presents for routine wellness check with no acute complaints. Reports stable weight. Discusses intermittent right-hand tremor, which has improved compared to last year, primarily noticeable during writing activities. Denies bilateral involvement or other associated symptoms. Recently experienced right-hand pain possibly related to positional use while playing word games on phone. Performs occasional breast self-exams. Denies leg swelling. Reports normal bowel movements. No family history of diabetes. Diet and exercise: Patient reports attempting to maintain healthy eating habits and stays active with regular walking.    Laboratory/Imaging Review:  - Last mammogram: October 2023  - Recent thyroid studies (2023): Within normal limits  - Previous B12: Within normal limits  - Previous A1C: 5.6  - Previous fasting glucose: 93 mg/dL    BP Readings from Last 3 Encounters:   12/04/24 134/78   09/05/23 132/86   09/01/22 134/82       Pulse Readings from Last 3 Encounters:   12/04/24 67   09/05/23 64   09/01/22 66        Wt Readings from Last 3 Encounters:   12/04/24 78.7 kg (173 lb 9.6 oz)   09/05/23 78 kg (172 lb)   09/01/22 76.7 kg (169 lb 3.2 oz)        Current Outpatient Medications   Medication Sig Dispense Refill    estradiol (ESTRACE) 0.1 MG/GM vaginal cream Place 2 g vaginally daily 1 each 3    labetalol (NORMODYNE) 100 MG tablet Take 1 tablet by mouth 2 times daily 180 tablet 3     No current facility-administered medications for this visit.     Review of Systems   Constitutional:  Negative for appetite change, chills, fatigue and fever.   HENT: Negative.     Eyes: Negative.    Respiratory:  Negative for cough, shortness of breath and wheezing.    Cardiovascular:  Negative for chest pain, palpitations and leg swelling.   Gastrointestinal:  Negative for abdominal pain, constipation, diarrhea and nausea.   Endocrine: Negative for cold intolerance, heat intolerance, polydipsia, polyphagia and polyuria.   Genitourinary:

## 2024-12-05 LAB
ALBUMIN/GLOBULIN RATIO: 2 G/DL
ALBUMIN: 4.5 G/DL (ref 3.5–5)
ALP BLD-CCNC: 81 UNITS/L (ref 38–126)
ALT SERPL-CCNC: 46 UNITS/L (ref 4–35)
ANION GAP SERPL CALCULATED.3IONS-SCNC: 6.5 MMOL/L (ref 3–11)
AST SERPL-CCNC: 40 UNITS/L (ref 14–36)
BILIRUB SERPL-MCNC: 0.9 MG/DL (ref 0.2–1.3)
BUN BLDV-MCNC: 20 MG/DL (ref 7–17)
CALCIUM SERPL-MCNC: 9.5 MG/DL (ref 8.4–10.2)
CHLORIDE BLD-SCNC: 107 MMOL/L (ref 98–120)
CHOLESTEROL, TOTAL: 207 MG/DL (ref 50–200)
CHOLESTEROL/HDL RATIO: 2.76 RATIO (ref 0–4.5)
CO2: 27 MMOL/L (ref 22–31)
CREAT SERPL-MCNC: 0.9 MG/DL (ref 0.5–1)
CREATININE, RANDOM URINE: 232.1 MG/DL (ref 20–370)
GFR, ESTIMATED: > 60
GLOBULIN: 2.3 G/DL
GLUCOSE: 97 MG/DL (ref 65–105)
HDLC SERPL-MCNC: 75 MG/DL (ref 36–68)
LDL CHOLESTEROL: 118.6 MG/DL (ref 0–160)
MICROALBUMIN/CREAT 24H UR: 1.7 MG/DL (ref 0–1.7)
MICROALBUMIN/CREAT UR-RTO: 7.32
POTASSIUM SERPL-SCNC: 4.4 MMOL/L (ref 3.6–5)
SODIUM BLD-SCNC: 140 MMOL/L (ref 135–145)
TOTAL PROTEIN: 6.9 G/DL (ref 6.3–8.2)
TRIGL SERPL-MCNC: 67 MG/DL (ref 10–250)
VLDLC SERPL CALC-MCNC: 13 MG/DL (ref 0–50)

## 2024-12-14 DIAGNOSIS — R74.8 ELEVATED LIVER ENZYMES: Primary | ICD-10-CM

## 2024-12-14 ASSESSMENT — ENCOUNTER SYMPTOMS
CONSTIPATION: 0
NAUSEA: 0
WHEEZING: 0
SHORTNESS OF BREATH: 0
DIARRHEA: 0
EYES NEGATIVE: 1
ABDOMINAL PAIN: 0
COUGH: 0

## 2024-12-14 NOTE — RESULT ENCOUNTER NOTE
Your mammogram result is normal.  Please call the office or message through [facility] with any additional questions or concerns.